# Patient Record
Sex: MALE | Race: WHITE | Employment: OTHER | URBAN - METROPOLITAN AREA
[De-identification: names, ages, dates, MRNs, and addresses within clinical notes are randomized per-mention and may not be internally consistent; named-entity substitution may affect disease eponyms.]

---

## 2018-03-07 ENCOUNTER — HOSPITAL ENCOUNTER (OUTPATIENT)
Dept: LAB | Age: 78
Discharge: HOME | End: 2018-03-07
Payer: MEDICARE

## 2018-03-07 LAB
ANION GAP SERPL CALCULATED.3IONS-SCNC: 10 MEQ/L (ref 7–13)
BUN BLDV-MCNC: 11 MG/DL (ref 8–23)
CALCIUM SERPL-MCNC: 9.3 MG/DL (ref 8.6–10.2)
CHLORIDE BLD-SCNC: 99 MEQ/L (ref 98–107)
CHOLESTEROL, TOTAL: 118 MG/DL (ref 0–199)
CO2: 30 MEQ/L (ref 22–29)
CREAT SERPL-MCNC: 0.66 MG/DL (ref 0.7–1.2)
FERRITIN: 33.3 NG/ML (ref 30–400)
GFR AFRICAN AMERICAN: >60
GFR NON-AFRICAN AMERICAN: >60
GLUCOSE BLD-MCNC: 89 MG/DL (ref 74–109)
HCT VFR BLD CALC: 45.8 % (ref 42–52)
HDLC SERPL-MCNC: 82 MG/DL (ref 40–59)
HEMOGLOBIN: 15.1 G/DL (ref 14–18)
IRON SATURATION: 29 % (ref 11–46)
IRON: 100 UG/DL (ref 59–158)
LDL CHOLESTEROL CALCULATED: 27 MG/DL (ref 0–129)
MCH RBC QN AUTO: 31.7 PG (ref 27–31.3)
MCHC RBC AUTO-ENTMCNC: 32.9 % (ref 33–37)
MCV RBC AUTO: 96.3 FL (ref 80–100)
PDW BLD-RTO: 13.5 % (ref 11.5–14.5)
PLATELET # BLD: 230 K/UL (ref 130–400)
POTASSIUM SERPL-SCNC: 4.4 MEQ/L (ref 3.5–5.1)
RBC # BLD: 4.76 M/UL (ref 4.7–6.1)
SODIUM BLD-SCNC: 139 MEQ/L (ref 132–144)
TOTAL IRON BINDING CAPACITY: 341 UG/DL (ref 178–450)
TRIGL SERPL-MCNC: 46 MG/DL (ref 0–200)
VITAMIN B-12: 532 PG/ML (ref 232–1245)
WBC # BLD: 5.1 K/UL (ref 4.8–10.8)

## 2018-03-07 PROCEDURE — 82607 VITAMIN B-12: CPT

## 2018-03-07 PROCEDURE — 80048 BASIC METABOLIC PNL TOTAL CA: CPT

## 2018-03-07 PROCEDURE — 83540 ASSAY OF IRON: CPT

## 2018-03-07 PROCEDURE — 80061 LIPID PANEL: CPT

## 2018-03-07 PROCEDURE — 82728 ASSAY OF FERRITIN: CPT

## 2018-03-07 PROCEDURE — 36415 COLL VENOUS BLD VENIPUNCTURE: CPT

## 2018-03-07 PROCEDURE — 85027 COMPLETE CBC AUTOMATED: CPT

## 2018-03-07 PROCEDURE — 83550 IRON BINDING TEST: CPT

## 2020-01-15 ENCOUNTER — HOSPITAL ENCOUNTER (OUTPATIENT)
Dept: ULTRASOUND IMAGING | Age: 80
Discharge: HOME | End: 2020-01-17
Payer: MEDICARE

## 2020-01-15 PROCEDURE — 76775 US EXAM ABDO BACK WALL LIM: CPT

## 2021-08-19 ENCOUNTER — HOSPITAL ENCOUNTER (OUTPATIENT)
Dept: LAB | Age: 81
Discharge: HOME | End: 2021-08-19
Payer: MEDICARE

## 2021-08-19 LAB
ALBUMIN SERPL-MCNC: 4.1 G/DL (ref 3.5–4.6)
ALP BLD-CCNC: 72 U/L (ref 35–104)
ALT SERPL-CCNC: 9 U/L (ref 0–41)
ANION GAP SERPL CALCULATED.3IONS-SCNC: 12 MEQ/L (ref 9–15)
AST SERPL-CCNC: 15 U/L (ref 0–40)
BILIRUB SERPL-MCNC: 0.6 MG/DL (ref 0.2–0.7)
BUN BLDV-MCNC: 12 MG/DL (ref 8–23)
CALCIUM SERPL-MCNC: 9.2 MG/DL (ref 8.5–9.9)
CHLORIDE BLD-SCNC: 99 MEQ/L (ref 95–107)
CHOLESTEROL, TOTAL: 122 MG/DL (ref 0–199)
CO2: 27 MEQ/L (ref 20–31)
CREAT SERPL-MCNC: 0.74 MG/DL (ref 0.7–1.2)
GFR AFRICAN AMERICAN: >60
GFR NON-AFRICAN AMERICAN: >60
GLOBULIN: 3 G/DL (ref 2.3–3.5)
GLUCOSE BLD-MCNC: 84 MG/DL (ref 70–99)
HDLC SERPL-MCNC: 67 MG/DL (ref 40–59)
LDL CHOLESTEROL CALCULATED: 42 MG/DL (ref 0–129)
POTASSIUM SERPL-SCNC: 3.9 MEQ/L (ref 3.4–4.9)
SODIUM BLD-SCNC: 138 MEQ/L (ref 135–144)
TOTAL PROTEIN: 7.1 G/DL (ref 6.3–8)
TRIGL SERPL-MCNC: 67 MG/DL (ref 0–150)

## 2021-08-19 PROCEDURE — 36415 COLL VENOUS BLD VENIPUNCTURE: CPT

## 2021-08-19 PROCEDURE — 80053 COMPREHEN METABOLIC PANEL: CPT

## 2021-08-19 PROCEDURE — 80061 LIPID PANEL: CPT

## 2023-10-01 PROBLEM — R00.1 SINUS BRADYCARDIA: Status: ACTIVE | Noted: 2023-10-01

## 2023-10-01 PROBLEM — I42.9 CARDIOMYOPATHY (MULTI): Status: ACTIVE | Noted: 2023-10-01

## 2023-10-01 PROBLEM — Z98.61 CAD S/P PERCUTANEOUS CORONARY ANGIOPLASTY: Status: ACTIVE | Noted: 2023-10-01

## 2023-10-01 PROBLEM — R93.1 DECREASED CARDIAC EJECTION FRACTION: Status: ACTIVE | Noted: 2023-10-01

## 2023-10-01 PROBLEM — E78.2 MIXED HYPERLIPIDEMIA: Status: ACTIVE | Noted: 2023-10-01

## 2023-10-01 PROBLEM — Z98.61 HISTORY OF PTCA: Status: ACTIVE | Noted: 2023-10-01

## 2023-10-01 PROBLEM — G35 MULTIPLE SCLEROSIS (MULTI): Status: ACTIVE | Noted: 2023-10-01

## 2023-10-01 PROBLEM — I25.10 CAD S/P PERCUTANEOUS CORONARY ANGIOPLASTY: Status: ACTIVE | Noted: 2023-10-01

## 2023-10-01 PROBLEM — Z87.891 FORMER SMOKER: Status: ACTIVE | Noted: 2023-10-01

## 2023-10-01 RX ORDER — ASPIRIN 81 MG/1
1 TABLET ORAL DAILY
COMMUNITY
Start: 2022-03-23

## 2023-10-01 RX ORDER — LISINOPRIL 2.5 MG/1
1 TABLET ORAL DAILY
COMMUNITY
Start: 2022-03-23 | End: 2024-04-11

## 2023-10-01 RX ORDER — NITROGLYCERIN 0.4 MG/1
TABLET SUBLINGUAL
COMMUNITY
Start: 2022-03-23

## 2023-10-01 RX ORDER — ATORVASTATIN CALCIUM 80 MG/1
1 TABLET, FILM COATED ORAL NIGHTLY
COMMUNITY
Start: 2022-03-23 | End: 2024-04-11

## 2023-10-01 RX ORDER — CALCIUM CARBONATE/VITAMIN D3 600 MG-10
1 TABLET ORAL 2 TIMES DAILY
COMMUNITY
Start: 2022-03-23

## 2023-10-01 RX ORDER — POLYETHYLENE GLYCOL 3350 17 G/17G
17 POWDER, FOR SOLUTION ORAL DAILY
COMMUNITY
Start: 2022-10-10 | End: 2023-10-04 | Stop reason: ALTCHOICE

## 2023-10-04 ENCOUNTER — OFFICE VISIT (OUTPATIENT)
Dept: CARDIOLOGY | Facility: CLINIC | Age: 83
End: 2023-10-04
Payer: COMMERCIAL

## 2023-10-04 VITALS
BODY MASS INDEX: 17.78 KG/M2 | HEART RATE: 56 BPM | SYSTOLIC BLOOD PRESSURE: 112 MMHG | DIASTOLIC BLOOD PRESSURE: 60 MMHG | HEIGHT: 62 IN | WEIGHT: 96.6 LBS

## 2023-10-04 DIAGNOSIS — R00.1 SINUS BRADYCARDIA: ICD-10-CM

## 2023-10-04 DIAGNOSIS — I25.10 CAD S/P PERCUTANEOUS CORONARY ANGIOPLASTY: ICD-10-CM

## 2023-10-04 DIAGNOSIS — G35 MULTIPLE SCLEROSIS (MULTI): ICD-10-CM

## 2023-10-04 DIAGNOSIS — Z98.61 CAD S/P PERCUTANEOUS CORONARY ANGIOPLASTY: ICD-10-CM

## 2023-10-04 DIAGNOSIS — E78.2 MIXED HYPERLIPIDEMIA: ICD-10-CM

## 2023-10-04 DIAGNOSIS — I42.8 OTHER CARDIOMYOPATHY (MULTI): ICD-10-CM

## 2023-10-04 PROCEDURE — 99214 OFFICE O/P EST MOD 30 MIN: CPT | Performed by: INTERNAL MEDICINE

## 2023-10-04 PROCEDURE — 1159F MED LIST DOCD IN RCRD: CPT | Performed by: INTERNAL MEDICINE

## 2023-10-04 NOTE — PROGRESS NOTES
Patient:  Faheem Mosquera  YOB: 1940  MRN: [id number]       Chief Complaint/Active Symptoms:       Faheem Mosquera is a 83 y.o. male who returns today for cardiac follow-up.  Dizziness.        Objective:     Patient is an 83-year-old  male with past medical history significant for coronary artery disease, multivessel PCI most recent drug-eluting stent LAD, first diagonal branch, first obtuse marginal branch 2007, hyperlipidemia, sinus bradycardia, multiple sclerosis who presents for follow-up cardiovascular care.    Denies chest pain, dyspnea, palpitations.  Denies nausea, vomiting. Patient's had dizziness without near syncope or yareli syncope. Denies edema.    Social history:  Quit smoking in his 40s, smoked 1/2 pack/day for 20 years  Denies alcohol use    Family history:  Mother  coronary artery disease  Father  cerebral aneurysm      Vitals:    10/04/23 1203   BP: 112/60   Pulse: 56       Vitals:    10/04/23 1203   Weight: (!) 43.8 kg (96 lb 9.6 oz)       Allergies:     No Known Allergies       Medications:     Current Outpatient Medications   Medication Instructions    aspirin 81 mg EC tablet 1 tablet, oral, Daily    atorvastatin (Lipitor) 80 mg tablet 1 tablet, oral, Nightly    lisinopril 2.5 mg tablet 1 tablet, oral, Daily    nitroglycerin (Nitrostat) 0.4 mg SL tablet sublingual    omega-3 fatty acids-fish oil (One-Per-Day Omega-3) 684-1,200 mg capsule 1 capsule, oral, 2 times daily       Physical Examination:   GENERAL:  Well developed, well nourished, in no acute distress.  CHEST:  Symmetric and nontender.  NEURO/PSYCH:  Alert and oriented times three with approppriate behavior and responses.  NECK:  Supple, no JVD, no bruit.  LUNGS:  Clear to auscultation bilaterally, normal respiratory effort.  HEART:  Rate and rhythm regular with no evident murmur, no gallop appreciated.        There are no rubs, clicks or heaves.  EXTREMITIES:  Warm with good color, no  clubbing or cyanosis.  There is no edema noted.  PERIPHERAL VASCULAR:  Pulses present and equally palpable; 2+ throughout.      Lab:     CBC:   Lab Results   Component Value Date    WBC 9.7 09/21/2023    RBC 4.05 (L) 09/21/2023    HGB 12.6 (L) 09/21/2023    HCT 38.3 (L) 09/21/2023     09/21/2023        CMP:    Lab Results   Component Value Date     09/21/2023    K 3.9 09/21/2023     09/21/2023    CO2 28 09/21/2023    BUN 12 09/21/2023    CREATININE 0.67 09/21/2023    GLUCOSE 90 09/21/2023    CALCIUM 8.5 (L) 09/21/2023       Magnesium:    Lab Results   Component Value Date    MG 1.81 09/20/2023       BNP:   Lab Results   Component Value Date     (H) 09/18/2023        PT/INR:    Lab Results   Component Value Date    PROTIME 14.1 (H) 09/18/2023    INR 1.3 (H) 09/18/2023     BMP:  Lab Results   Component Value Date     09/21/2023     09/20/2023     09/19/2023    K 3.9 09/21/2023    K 3.5 09/20/2023    K 3.8 09/19/2023     09/21/2023     09/20/2023     (H) 09/19/2023    CO2 28 09/21/2023    CO2 26 09/20/2023    CO2 26 09/19/2023    BUN 12 09/21/2023    BUN 17 09/20/2023    BUN 12 09/19/2023    CREATININE 0.67 09/21/2023    CREATININE 0.68 09/20/2023    CREATININE 0.72 09/19/2023       CBC:  Lab Results   Component Value Date    WBC 9.7 09/21/2023    WBC 13.1 (H) 09/20/2023    WBC 19.4 (H) 09/19/2023    RBC 4.05 (L) 09/21/2023    RBC 3.73 (L) 09/20/2023    RBC 3.92 (L) 09/19/2023    HGB 12.6 (L) 09/21/2023    HGB 11.6 (L) 09/20/2023    HGB 12.5 (L) 09/19/2023    HCT 38.3 (L) 09/21/2023    HCT 34.6 (L) 09/20/2023    HCT 38.0 (L) 09/19/2023    MCV 95 09/21/2023    MCV 93 09/20/2023    MCV 96 09/19/2023    MCHC 32.9 09/21/2023    MCHC 33.5 09/20/2023    MCHC 32.6 09/19/2023    RDW 13.2 09/21/2023    RDW 13.3 09/20/2023    RDW 13.2 09/19/2023     09/21/2023     09/20/2023     09/19/2023       Cardiac Enzymes:    Lab Results   Component Value  Date    TROPHS 23 (H) 09/18/2023    TROPHS 23 (H) 09/18/2023    TROPHS 19 09/18/2023       Hepatic Function Panel:    Lab Results   Component Value Date    ALKPHOS 49 09/19/2023    ALT 12 09/19/2023    AST 17 09/19/2023    PROT 5.7 (L) 09/19/2023    BILITOT 0.8 09/19/2023    BILIDIR 0.1 10/10/2022         Diagnostic Studies:   The following cardiovascular studies have been updated and reviewed    Labs 8/19/21  , TG 67, HDL 67, LDL 42    Cardiac Cath 1/17/08  1. EF 60%  2. Proximal LAD 50% stenosis  3. Mid LAD 50% stenosis    August 11, 2023 Lexiscan nuclear stress test: No evidence of ischemia, or myocardial infarction EF 44%. Baseline rhythm was sinus bradycardia 43 bpm.    Echo 9/19/23  EF 60%  2. Mild aortic valve stenosis      ASSESSMENT     Coronary artery disease/multivessel PCI most recent drug-eluting stents LAD, first diagonal branch, first obtuse marginal branch January 4, 2017  Hyperlipidemia  Cardiomyopathy  Sinus bradycardia  Multiple sclerosis    PLAN     Patient appears to be stable from a cardiac standpoint.  No symptoms of angina and no ischemia on most recent stress test.  No evidence of heart failure and normal left ventricular systolic function on recent echocardiogram.  No symptomatic arrhythmia.  Continue conservative medical therapy as prescribed.  Follow-up in 1 year.      Please excuse any errors in grammar or translation related to this dictation. Voice recognition software was utilized to prepare this document.

## 2024-04-11 DIAGNOSIS — I25.10 ATHEROSCLEROTIC HEART DISEASE OF NATIVE CORONARY ARTERY WITHOUT ANGINA PECTORIS: ICD-10-CM

## 2024-04-11 DIAGNOSIS — E78.2 MIXED HYPERLIPIDEMIA: ICD-10-CM

## 2024-04-11 DIAGNOSIS — Z98.61 CORONARY ANGIOPLASTY STATUS: ICD-10-CM

## 2024-04-11 RX ORDER — LISINOPRIL 2.5 MG/1
2.5 TABLET ORAL DAILY
Qty: 90 TABLET | Refills: 3 | Status: SHIPPED | OUTPATIENT
Start: 2024-04-11

## 2024-04-11 RX ORDER — ATORVASTATIN CALCIUM 80 MG/1
80 TABLET, FILM COATED ORAL NIGHTLY
Qty: 90 TABLET | Refills: 3 | Status: SHIPPED | OUTPATIENT
Start: 2024-04-11

## 2024-10-09 ENCOUNTER — APPOINTMENT (OUTPATIENT)
Dept: CARDIOLOGY | Facility: CLINIC | Age: 84
End: 2024-10-09
Payer: COMMERCIAL

## 2024-10-09 ENCOUNTER — LAB (OUTPATIENT)
Dept: LAB | Facility: LAB | Age: 84
End: 2024-10-09
Payer: COMMERCIAL

## 2024-10-09 VITALS
HEART RATE: 56 BPM | WEIGHT: 103.2 LBS | SYSTOLIC BLOOD PRESSURE: 112 MMHG | HEIGHT: 62 IN | DIASTOLIC BLOOD PRESSURE: 62 MMHG | BODY MASS INDEX: 18.99 KG/M2

## 2024-10-09 DIAGNOSIS — I25.10 CAD S/P PERCUTANEOUS CORONARY ANGIOPLASTY: ICD-10-CM

## 2024-10-09 DIAGNOSIS — I25.5 ISCHEMIC CARDIOMYOPATHY: ICD-10-CM

## 2024-10-09 DIAGNOSIS — E78.2 MIXED HYPERLIPIDEMIA: ICD-10-CM

## 2024-10-09 DIAGNOSIS — Z98.61 CAD S/P PERCUTANEOUS CORONARY ANGIOPLASTY: ICD-10-CM

## 2024-10-09 DIAGNOSIS — Z98.61 HISTORY OF PTCA: ICD-10-CM

## 2024-10-09 DIAGNOSIS — Z87.891 FORMER SMOKER: ICD-10-CM

## 2024-10-09 DIAGNOSIS — R00.1 SINUS BRADYCARDIA: ICD-10-CM

## 2024-10-09 LAB
ALBUMIN SERPL BCP-MCNC: 3.9 G/DL (ref 3.4–5)
ALP SERPL-CCNC: 74 U/L (ref 33–136)
ALT SERPL W P-5'-P-CCNC: 10 U/L (ref 10–52)
ANION GAP SERPL CALC-SCNC: 7 MMOL/L (ref 10–20)
AST SERPL W P-5'-P-CCNC: 13 U/L (ref 9–39)
BILIRUB SERPL-MCNC: 0.4 MG/DL (ref 0–1.2)
BUN SERPL-MCNC: 16 MG/DL (ref 6–23)
CALCIUM SERPL-MCNC: 9.1 MG/DL (ref 8.6–10.3)
CHLORIDE SERPL-SCNC: 104 MMOL/L (ref 98–107)
CHOLEST SERPL-MCNC: 114 MG/DL (ref 0–199)
CHOLESTEROL/HDL RATIO: 1.8
CO2 SERPL-SCNC: 32 MMOL/L (ref 21–32)
CREAT SERPL-MCNC: 0.78 MG/DL (ref 0.5–1.3)
EGFRCR SERPLBLD CKD-EPI 2021: 88 ML/MIN/1.73M*2
GLUCOSE SERPL-MCNC: 89 MG/DL (ref 74–99)
HDLC SERPL-MCNC: 62.8 MG/DL
LDLC SERPL CALC-MCNC: 40 MG/DL
NON HDL CHOLESTEROL: 51 MG/DL (ref 0–149)
POTASSIUM SERPL-SCNC: 4.1 MMOL/L (ref 3.5–5.3)
PROT SERPL-MCNC: 6.6 G/DL (ref 6.4–8.2)
SODIUM SERPL-SCNC: 139 MMOL/L (ref 136–145)
TRIGL SERPL-MCNC: 58 MG/DL (ref 0–149)
VLDL: 12 MG/DL (ref 0–40)

## 2024-10-09 PROCEDURE — 80053 COMPREHEN METABOLIC PANEL: CPT

## 2024-10-09 PROCEDURE — 99214 OFFICE O/P EST MOD 30 MIN: CPT | Performed by: INTERNAL MEDICINE

## 2024-10-09 PROCEDURE — 80061 LIPID PANEL: CPT

## 2024-10-09 PROCEDURE — 1159F MED LIST DOCD IN RCRD: CPT | Performed by: INTERNAL MEDICINE

## 2024-10-09 PROCEDURE — 36415 COLL VENOUS BLD VENIPUNCTURE: CPT

## 2024-10-09 RX ORDER — NITROGLYCERIN 0.4 MG/1
0.4 TABLET SUBLINGUAL EVERY 5 MIN PRN
Qty: 25 TABLET | Refills: 1 | Status: SHIPPED | OUTPATIENT
Start: 2024-10-09 | End: 2025-10-09

## 2024-10-09 NOTE — PROGRESS NOTES
CARDIOLOGY OFFICE VISIT      CHIEF COMPLAINT  Chief Complaint   Patient presents with    Follow-up     1 year follow up        HISTORY OF PRESENT ILLNESS  Patient is an 84-year-old  male with past medical history significant for coronary artery disease, multivessel PCI most recent drug-eluting stent LAD, first diagonal branch, first obtuse marginal branch 2007, hyperlipidemia, sinus bradycardia, multiple sclerosis who presents for follow-up cardiovascular care.     Patient has a rare mild chest pain with increased stress.  He has not required nitroglycerin.  Denies dyspnea.  Occasional palpitation lasting seconds.  Denies nausea, vomiting.  Occasional dizziness without near syncope or yareli syncope.  Denies edema.  Patient does not exercise.  Patient has not followed with a PCP for years.    Social history:  Quit smoking in his 40s, smoked 1/2 pack/day for 20 years  Denies alcohol use     Family history:  Mother  coronary artery disease  Father  cerebral aneurysm    Past surgical history:  Bilateral cataract  Tonsillectomy  Gallstone removal    Review of systems are negative, noncontributory, as previous mentioned x 12 systems.    Assessment:  Coronary artery disease/multivessel PCI most recent drug-eluting stents LAD, first diagonal branch, first obtuse marginal branch 2017  Hyperlipidemia  Cardiomyopathy  Sinus bradycardia  Multiple sclerosis    Recommendations:     Patient appears to be stable from a cardiac standpoint.  No symptoms of angina and no ischemia on most recent stress test.  No evidence of heart failure and normal left ventricular systolic function on recent echocardiogram.  No symptomatic arrhythmia.  Continue conservative medical therapy as prescribed.  Check CMP, lipid profile today.  Follow-up in 1 year.  Check CMP, lipid profile in 1 year.  Obtain new PCP for noncardiac care.        Please excuse any errors in grammar or translation related to this  dictation. Voice recognition software was utilized to prepare this document.     Recent Cardiovascular Testing:  The following results have been reviewed and updated.   Labs 8/19/21  , TG 67, HDL 67, LDL 42     Cardiac Cath 1/17/08  1. EF 60%  2. Proximal LAD 50% stenosis  3. Mid LAD 50% stenosis     August 11, 2023 Lexiscan nuclear stress test: No evidence of ischemia, or myocardial infarction EF 44%. Baseline rhythm was sinus bradycardia 43 bpm.     Echo 9/19/23  EF 60%  2. Mild aortic valve stenosis      VITALS  Vitals:    10/09/24 1137   BP: 112/62   Pulse: 56     Wt Readings from Last 4 Encounters:   10/09/24 46.8 kg (103 lb 3.2 oz)   10/04/23 (!) 43.8 kg (96 lb 9.6 oz)   08/16/23 (!) 43.6 kg (96 lb 3 oz)   08/10/22 49.2 kg (108 lb 9 oz)       PHYSICAL EXAM:  GENERAL:  Well developed, well nourished, in no acute distress.  CHEST:  Symmetric and nontender.  NEURO/PSYCH:  Alert and oriented times three with approppriate behavior and responses.  NECK:  Supple, no JVD, no bruit.  LUNGS:  Clear to auscultation bilaterally, normal respiratory effort.  HEART:  Rate and rhythm REGULAR with no evident murmur, no gallop appreciated.    There are no rubs, clicks or heaves.  EXTREMITIES:  Warm with good color, no clubbing or cyanosis.  There is no edema noted.  PERIPHERAL VASCULAR:  Pulses present and equally palpable; 2+ throughout.    ASSESSMENT AND PLAN  Problem List Items Addressed This Visit          Cardiac and Vasculature    CAD S/P percutaneous coronary angioplasty    Relevant Medications    nitroglycerin (Nitrostat) 0.4 mg SL tablet    Other Relevant Orders    Comprehensive metabolic panel    Comprehensive metabolic panel    Referral to Primary Care    History of PTCA    Mixed hyperlipidemia    Relevant Orders    Lipid panel    Lipid panel    Referral to Primary Care    Sinus bradycardia    Relevant Medications    nitroglycerin (Nitrostat) 0.4 mg SL tablet    Cardiomyopathy    Relevant Medications     "nitroglycerin (Nitrostat) 0.4 mg SL tablet       Endocrine/Metabolic    BMI less than 19,adult       Tobacco    Former smoker       Past Medical History  Past Medical History:   Diagnosis Date    Underweight     Underweight       Social History  Social History     Tobacco Use    Smoking status: Former     Current packs/day: 0.00     Types: Cigarettes     Quit date:      Years since quittin.7    Smokeless tobacco: Current     Types: Chew   Substance Use Topics    Alcohol use: Yes     Comment: 3-4x a year    Drug use: Never       Family History     Family History   Problem Relation Name Age of Onset    Coronary artery disease Mother      Cerebral aneurysm Father          Allergies:  No Known Allergies     Outpatient Medications:  Current Outpatient Medications   Medication Instructions    aspirin 81 mg EC tablet 1 tablet, oral, Daily    atorvastatin (LIPITOR) 80 mg, oral, Nightly    lisinopril 2.5 mg, oral, Daily    nitroglycerin (NITROSTAT) 0.4 mg, Every 5 min PRN    omega-3 fatty acids-fish oil (One-Per-Day Omega-3) 684-1,200 mg capsule 1 capsule, oral, 2 times daily        Recent Lab Results:    CMP:    Lab Results   Component Value Date     2023    K 3.9 2023     2023    CO2 28 2023    BUN 12 2023    CREATININE 0.67 2023    GLUCOSE 90 2023    CALCIUM 8.5 (L) 2023       Magnesium:    Lab Results   Component Value Date    MG 1.81 2023       Lipid Profile:    No results found for: \"CHLPL\", \"TRIG\", \"HDL\", \"LDLCALC\", \"LDLDIRECT\"    TSH:    No results found for: \"TSH\"    BNP:   Lab Results   Component Value Date     (H) 2023        PT/INR:    Lab Results   Component Value Date    PROTIME 14.1 (H) 2023    INR 1.3 (H) 2023       HgBA1c:    No results found for: \"HGBA1C\"    BMP:  Lab Results   Component Value Date     2023     2023     2023    K 3.9 2023    K 3.5 2023    K 3.8 " 09/19/2023     09/21/2023     09/20/2023     (H) 09/19/2023    CO2 28 09/21/2023    CO2 26 09/20/2023    CO2 26 09/19/2023    BUN 12 09/21/2023    BUN 17 09/20/2023    BUN 12 09/19/2023    CREATININE 0.67 09/21/2023    CREATININE 0.68 09/20/2023    CREATININE 0.72 09/19/2023       CBC:  Lab Results   Component Value Date    WBC 9.7 09/21/2023    WBC 13.1 (H) 09/20/2023    WBC 19.4 (H) 09/19/2023    RBC 4.05 (L) 09/21/2023    RBC 3.73 (L) 09/20/2023    RBC 3.92 (L) 09/19/2023    HGB 12.6 (L) 09/21/2023    HGB 11.6 (L) 09/20/2023    HGB 12.5 (L) 09/19/2023    HCT 38.3 (L) 09/21/2023    HCT 34.6 (L) 09/20/2023    HCT 38.0 (L) 09/19/2023    MCV 95 09/21/2023    MCV 93 09/20/2023    MCV 96 09/19/2023    MCHC 32.9 09/21/2023    MCHC 33.5 09/20/2023    MCHC 32.6 09/19/2023    RDW 13.2 09/21/2023    RDW 13.3 09/20/2023    RDW 13.2 09/19/2023     09/21/2023     09/20/2023     09/19/2023       Cardiac Enzymes:    Lab Results   Component Value Date    TROPHS 23 (H) 09/18/2023    TROPHS 23 (H) 09/18/2023    TROPHS 19 09/18/2023       Hepatic Function Panel:    Lab Results   Component Value Date    ALKPHOS 49 09/19/2023    ALT 12 09/19/2023    AST 17 09/19/2023    PROT 5.7 (L) 09/19/2023    BILITOT 0.8 09/19/2023    BILIDIR 0.1 10/10/2022           Tim Donovan DO

## 2024-10-09 NOTE — PATIENT INSTRUCTIONS
CMP and Lipid today and one year  Continue same medications and treatments.   Patient educated on proper medication use.   Patient educated on risk factor modification.   Please bring any lab results from other providers / physicians to your next appointment.     Please bring all medicines, vitamins, and herbal supplements with you when you come to the office.   Refer to PCP Dr Pitt  Prescriptions will not be filled unless you are compliant with your follow up appointments or have a follow up appointment scheduled as per instruction of your physician. Refills should be requested at the time of your visit.  1 year visit

## 2024-11-11 ENCOUNTER — APPOINTMENT (OUTPATIENT)
Dept: PRIMARY CARE | Facility: CLINIC | Age: 84
End: 2024-11-11
Payer: COMMERCIAL

## 2024-12-10 ENCOUNTER — APPOINTMENT (OUTPATIENT)
Dept: RADIOLOGY | Facility: HOSPITAL | Age: 84
End: 2024-12-10
Payer: COMMERCIAL

## 2024-12-10 ENCOUNTER — HOSPITAL ENCOUNTER (EMERGENCY)
Facility: HOSPITAL | Age: 84
Discharge: HOME | End: 2024-12-10
Payer: COMMERCIAL

## 2024-12-10 VITALS
SYSTOLIC BLOOD PRESSURE: 130 MMHG | OXYGEN SATURATION: 96 % | HEIGHT: 63 IN | RESPIRATION RATE: 18 BRPM | DIASTOLIC BLOOD PRESSURE: 69 MMHG | TEMPERATURE: 97.2 F | HEART RATE: 54 BPM | BODY MASS INDEX: 18.61 KG/M2 | WEIGHT: 105 LBS

## 2024-12-10 DIAGNOSIS — S00.83XA CONTUSION OF FOREHEAD, INITIAL ENCOUNTER: ICD-10-CM
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
## 2024-12-10 DIAGNOSIS — S09.90XA HEAD INJURY, INITIAL ENCOUNTER: Primary | ICD-10-CM

## 2024-12-10 DIAGNOSIS — S80.211A ABRASION OF RIGHT KNEE, INITIAL ENCOUNTER: ICD-10-CM

## 2024-12-10 PROCEDURE — 90715 TDAP VACCINE 7 YRS/> IM: CPT | Performed by: PHYSICIAN ASSISTANT

## 2024-12-10 PROCEDURE — 70450 CT HEAD/BRAIN W/O DYE: CPT | Performed by: RADIOLOGY

## 2024-12-10 PROCEDURE — 73564 X-RAY EXAM KNEE 4 OR MORE: CPT | Mod: RT

## 2024-12-10 PROCEDURE — 70450 CT HEAD/BRAIN W/O DYE: CPT

## 2024-12-10 PROCEDURE — 72125 CT NECK SPINE W/O DYE: CPT

## 2024-12-10 PROCEDURE — 90471 IMMUNIZATION ADMIN: CPT | Performed by: PHYSICIAN ASSISTANT

## 2024-12-10 PROCEDURE — 2500000004 HC RX 250 GENERAL PHARMACY W/ HCPCS (ALT 636 FOR OP/ED): Performed by: PHYSICIAN ASSISTANT

## 2024-12-10 PROCEDURE — 2500000005 HC RX 250 GENERAL PHARMACY W/O HCPCS: Performed by: PHYSICIAN ASSISTANT

## 2024-12-10 PROCEDURE — 72125 CT NECK SPINE W/O DYE: CPT | Performed by: RADIOLOGY

## 2024-12-10 PROCEDURE — 73564 X-RAY EXAM KNEE 4 OR MORE: CPT | Mod: RIGHT SIDE | Performed by: RADIOLOGY

## 2024-12-10 PROCEDURE — 99284 EMERGENCY DEPT VISIT MOD MDM: CPT | Mod: 25

## 2024-12-10 RX ORDER — BACITRACIN 500 [USP'U]/G
OINTMENT TOPICAL ONCE
Status: COMPLETED | OUTPATIENT
Start: 2024-12-10 | End: 2024-12-10

## 2024-12-10 ASSESSMENT — COLUMBIA-SUICIDE SEVERITY RATING SCALE - C-SSRS
2. HAVE YOU ACTUALLY HAD ANY THOUGHTS OF KILLING YOURSELF?: NO
6. HAVE YOU EVER DONE ANYTHING, STARTED TO DO ANYTHING, OR PREPARED TO DO ANYTHING TO END YOUR LIFE?: NO
1. IN THE PAST MONTH, HAVE YOU WISHED YOU WERE DEAD OR WISHED YOU COULD GO TO SLEEP AND NOT WAKE UP?: NO

## 2024-12-10 ASSESSMENT — PAIN - FUNCTIONAL ASSESSMENT: PAIN_FUNCTIONAL_ASSESSMENT: 0-10

## 2024-12-10 ASSESSMENT — LIFESTYLE VARIABLES
HAVE YOU EVER FELT YOU SHOULD CUT DOWN ON YOUR DRINKING: NO
EVER HAD A DRINK FIRST THING IN THE MORNING TO STEADY YOUR NERVES TO GET RID OF A HANGOVER: NO
TOTAL SCORE: 0
HAVE PEOPLE ANNOYED YOU BY CRITICIZING YOUR DRINKING: NO
EVER FELT BAD OR GUILTY ABOUT YOUR DRINKING: NO

## 2024-12-10 ASSESSMENT — PAIN DESCRIPTION - LOCATION: LOCATION: HEAD

## 2024-12-10 ASSESSMENT — PAIN SCALES - GENERAL: PAINLEVEL_OUTOF10: 4

## 2024-12-11 NOTE — ED PROVIDER NOTES
HPI   Chief Complaint   Patient presents with    Fall     Walking dog at [address]. Hit head, no LOC, no thinners. +headache and nausea , R knee pain       84-year-old male with a history of MS, HLD, CAD presenting to the ER today with pain to his right knee, a bump on the right side of his forehead after a fall that occurred around 5:15 PM this evening.  Patient normally walks with a cane, he states that he was walking his dog and the dog pulled forward to go after the mailman at which point he fell forward onto his right knee and then struck the right side of his forehead on the driveway.  He remembers the entire event, no LOC and no use of blood thinners.  He states that he only has pain to the bump on his forehead but otherwise does not have any headache, dizziness or visual changes.  He is not having any neck pain or back pain from the fall.  He states he landed on his right knee, denying any other injury to his other extremities.  He is not having any pain to his chest or abdomen from the fall.  He did not take any pain medicine before coming to the ER.  He states he feels little nauseous from the fall but also was very hungry so he is unsure if it is the hunger causing his nausea.  He is unsure of his last tetanus shot.  No further complaints at this time.      History provided by:  Patient          Patient History   Past Medical History:   Diagnosis Date    Underweight     Underweight     Past Surgical History:   Procedure Laterality Date    CATARACT EXTRACTION, BILATERAL Bilateral     OTHER SURGICAL HISTORY  04/08/2022    Bladder surgery    OTHER SURGICAL HISTORY  04/08/2022    Cardiac catheterization with stent placement    OTHER SURGICAL HISTORY  04/08/2022    Tonsillectomy     Family History   Problem Relation Name Age of Onset    Coronary artery disease Mother      Cerebral aneurysm Father       Social History     Tobacco Use    Smoking status: Former     Current packs/day: 0.00     Types: Cigarettes     Quit  date:      Years since quittin.9    Smokeless tobacco: Current     Types: Chew   Substance Use Topics    Alcohol use: Yes     Comment: 3-4x a year    Drug use: Never       Physical Exam   ED Triage Vitals [12/10/24 1806]   Temperature Heart Rate Respirations BP   36.2 °C (97.2 °F) 54 18 130/69      Pulse Ox Temp Source Heart Rate Source Patient Position   96 % Temporal -- Sitting      BP Location FiO2 (%)     Right arm --       Physical Exam  Constitutional:       General: He is not in acute distress.  HENT:      Head:      Comments: Contusion with overlying abrasion, no active bleeding over the right side of the forehead.  No further signs of trauma throughout the face or scalp.  No palpable bony deformity.     Nose: Nose normal.      Mouth/Throat:      Mouth: Mucous membranes are moist.      Pharynx: Oropharynx is clear.   Eyes:      Extraocular Movements: Extraocular movements intact.      Conjunctiva/sclera: Conjunctivae normal.      Pupils: Pupils are equal, round, and reactive to light.   Cardiovascular:      Rate and Rhythm: Regular rhythm. Bradycardia present.      Pulses: Normal pulses.      Heart sounds: Normal heart sounds.   Pulmonary:      Effort: Pulmonary effort is normal.      Breath sounds: Normal breath sounds.   Abdominal:      General: Bowel sounds are normal. There is no distension.      Palpations: Abdomen is soft.      Tenderness: There is no abdominal tenderness. There is no guarding or rebound.      Comments: No signs of trauma to the abdomen or flanks.   Musculoskeletal:      Cervical back: Normal range of motion and neck supple.      Comments: Normal gait while using cane.  Facial contusion/abrasion and exam as above.  No midline tenderness, step-offs, paraspinal tenderness or signs of trauma throughout the cervical thoracic and lumbar spines.  Chest wall nontender, atraumatic.  5/5 strength and sensation to extremities without obvious bony deformity.  Mild tenderness across  anterior surface of the right knee without any edema or crepitus, erythema or ecchymosis.  Overlying superficial skin abrasion.  Negative anterior/posterior drawer sign.  No further tenderness or signs of trauma on exam. NVI   Skin:     General: Skin is warm.      Capillary Refill: Capillary refill takes less than 2 seconds.      Comments: Superficial skin abrasion overlying the right anterior knee without active bleeding.   Neurological:      General: No focal deficit present.      Mental Status: He is alert and oriented to person, place, and time.      Cranial Nerves: No cranial nerve deficit.      Sensory: No sensory deficit.      Motor: No weakness.      Coordination: Coordination normal.      Gait: Gait normal.      Comments: Normal gait using a cane.           ED Course & MDM   Diagnoses as of 12/10/24 2018   Head injury, initial encounter   Fall, initial encounter   Abrasion of right knee, initial encounter   Contusion of forehead, initial encounter                 No data recorded                                 Medical Decision Making  84-year-old male with history of MS, bradycardia, hyperlipidemia, CAD presenting to the ER today after he fell forward while walking his dog when his dog went after the mailman.  He fell onto his right knee, struck the right side of his forehead on the ground but denies LOC or use of blood thinners.  He has felt a little bit nauseous but states that he also was very hungry and would like something to eat.  He denies any further associated complaints and arrives afebrile with stable vital signs.  Patient did ambulate into the room with a cane, gait appears normal and he feels steady on his feet.  He is resting without signs of acute distress and GCS is 15.  On my exam he is bradycardic but regular, lungs are clear.  He does have a contusion with overlying abrasion to the right side of the forehead as well as an abrasion to the  front of the right knee.  There is no obvious  bony deformity or further signs of trauma on exam and he is neurologically intact without deficits.  Radiology studies are ordered as well as a tetanus shot.    CT head shows no acute intracranial abnormality and notes of mild right frontal scalp swelling consistent with patient's contusion on exam.  CT cervical spine shows no acute fracture or traumatic malalignment.  X-ray of the right knee shows degenerative changes but no osseous injury.  Patient is able to ambulate on his knee and has full range of motion and is neurovascularly intact, low suspicion for occult fracture at this time.  I did have nursing staff place bacitracin and dressing over top of the patient's abrasion to the right knee.  His tetanus shot was updated today.  On reassessment patient is resting comfortably without signs of acute distress and is still mentating appropriately, GCS remains 15.  Did discuss these results with patient and with family present.  I did discuss wound care management of his abrasions at home as well as the need for follow-up with his physician.  I also carefully outlined warning signs to return to the ED and they expressed understanding and agreed with plan of care today.  Patient tells me he is hungry and ready to go home to get some food.    Labs Reviewed - No data to display    CT head wo IV contrast   Final Result   CT HEAD:   1. No acute intracranial abnormality or calvarial fracture.   2. Mild right frontal scalp swelling, likely contusion or small   hematoma.             CT CERVICAL SPINE:   1. No acute fracture or traumatic malalignment of the cervical spine.        MACRO:   None        Signed by: Manfred Muhammad 12/10/2024 8:10 PM   Dictation workstation:   [id number]      CT cervical spine wo IV contrast   Final Result   CT HEAD:   1. No acute intracranial abnormality or calvarial fracture.   2. Mild right frontal scalp swelling, likely contusion or small   hematoma.             CT CERVICAL SPINE:   1. No  acute fracture or traumatic malalignment of the cervical spine.        MACRO:   None        Signed by: Manfred Muhammad 12/10/2024 8:10 PM   Dictation workstation:   [id number]      XR knee right 4+ views   Final Result   Degenerative changes without osseous injury evident.        MACRO:   None        Signed by: Josue Bailey 12/10/2024 7:44 PM   Dictation workstation:   [id number]            Procedure  Procedures     Palak Robledo PA-C  12/10/24 2019

## 2025-04-10 DIAGNOSIS — Z98.61 CORONARY ANGIOPLASTY STATUS: ICD-10-CM

## 2025-04-10 DIAGNOSIS — I25.10 ATHEROSCLEROTIC HEART DISEASE OF NATIVE CORONARY ARTERY WITHOUT ANGINA PECTORIS: ICD-10-CM

## 2025-04-10 DIAGNOSIS — E78.2 MIXED HYPERLIPIDEMIA: ICD-10-CM

## 2025-04-11 RX ORDER — ATORVASTATIN CALCIUM 80 MG/1
80 TABLET, FILM COATED ORAL NIGHTLY
Qty: 90 TABLET | Refills: 3 | Status: SHIPPED | OUTPATIENT
Start: 2025-04-11 | End: 2026-04-11

## 2025-04-11 RX ORDER — LISINOPRIL 2.5 MG/1
2.5 TABLET ORAL DAILY
Qty: 90 TABLET | Refills: 3 | Status: SHIPPED | OUTPATIENT
Start: 2025-04-11 | End: 2026-04-11

## 2025-04-11 NOTE — TELEPHONE ENCOUNTER
Received request for prescription refills for patient.   Patient follows with Dr. Donovan    Request is for lisinopril and liptor  Is patient currently on medication yes    Last OV 10/9/2024  Next OV 10/8/2025    Pended for signing and sent to provider

## 2025-10-08 ENCOUNTER — APPOINTMENT (OUTPATIENT)
Dept: CARDIOLOGY | Facility: CLINIC | Age: 85
End: 2025-10-08
Payer: COMMERCIAL